# Patient Record
Sex: FEMALE | Race: WHITE | NOT HISPANIC OR LATINO | Employment: FULL TIME | URBAN - NONMETROPOLITAN AREA
[De-identification: names, ages, dates, MRNs, and addresses within clinical notes are randomized per-mention and may not be internally consistent; named-entity substitution may affect disease eponyms.]

---

## 2017-06-03 ENCOUNTER — APPOINTMENT (OUTPATIENT)
Dept: CT IMAGING | Facility: HOSPITAL | Age: 42
End: 2017-06-03

## 2017-06-03 ENCOUNTER — HOSPITAL ENCOUNTER (EMERGENCY)
Facility: HOSPITAL | Age: 42
Discharge: HOME | End: 2017-06-03
Admitting: EMERGENCY MEDICINE

## 2017-06-03 VITALS
OXYGEN SATURATION: 98 % | WEIGHT: 180 LBS | HEIGHT: 67 IN | HEART RATE: 60 BPM | RESPIRATION RATE: 15 BRPM | SYSTOLIC BLOOD PRESSURE: 112 MMHG | TEMPERATURE: 97.8 F | DIASTOLIC BLOOD PRESSURE: 52 MMHG | BODY MASS INDEX: 28.25 KG/M2

## 2017-06-03 DIAGNOSIS — S00.10XS: Primary | ICD-10-CM

## 2017-06-03 DIAGNOSIS — S09.90XA HEAD INJURIES, INITIAL ENCOUNTER: ICD-10-CM

## 2017-06-03 LAB
ALBUMIN SERPL-MCNC: 4 G/DL (ref 3.5–5)
ALBUMIN/GLOB SERPL: 1.2 G/DL (ref 1.1–2.5)
ALP SERPL-CCNC: 53 U/L (ref 24–120)
ALT SERPL W P-5'-P-CCNC: <15 U/L (ref 0–54)
ANION GAP SERPL CALCULATED.3IONS-SCNC: 18 MMOL/L (ref 4–13)
AST SERPL-CCNC: 25 U/L (ref 7–45)
BASOPHILS # BLD AUTO: 0.02 10*3/MM3 (ref 0–0.2)
BASOPHILS NFR BLD AUTO: 0.3 % (ref 0–2)
BILIRUB SERPL-MCNC: 1.1 MG/DL (ref 0.1–1)
BUN BLD-MCNC: 14 MG/DL (ref 5–21)
BUN/CREAT SERPL: 20 (ref 7–25)
CALCIUM SPEC-SCNC: 8.5 MG/DL (ref 8.4–10.4)
CHLORIDE SERPL-SCNC: 100 MMOL/L (ref 98–110)
CO2 SERPL-SCNC: 21 MMOL/L (ref 24–31)
CREAT BLD-MCNC: 0.7 MG/DL (ref 0.5–1.4)
DEPRECATED RDW RBC AUTO: 41.6 FL (ref 40–54)
EOSINOPHIL # BLD AUTO: 0.07 10*3/MM3 (ref 0–0.7)
EOSINOPHIL NFR BLD AUTO: 0.9 % (ref 0–4)
ERYTHROCYTE [DISTWIDTH] IN BLOOD BY AUTOMATED COUNT: 12.8 % (ref 12–15)
ETHANOL UR QL: 0.27 %
GFR SERPL CREATININE-BSD FRML MDRD: 92 ML/MIN/1.73
GLOBULIN UR ELPH-MCNC: 3.4 GM/DL
GLUCOSE BLD-MCNC: 83 MG/DL (ref 70–100)
HCT VFR BLD AUTO: 36.1 % (ref 37–47)
HGB BLD-MCNC: 12.4 G/DL (ref 12–16)
HOLD SPECIMEN: NORMAL
HOLD SPECIMEN: NORMAL
IMM GRANULOCYTES # BLD: 0.01 10*3/MM3 (ref 0–0.03)
IMM GRANULOCYTES NFR BLD: 0.1 % (ref 0–5)
LYMPHOCYTES # BLD AUTO: 1.7 10*3/MM3 (ref 0.72–4.86)
LYMPHOCYTES NFR BLD AUTO: 21.6 % (ref 15–45)
MCH RBC QN AUTO: 30.7 PG (ref 28–32)
MCHC RBC AUTO-ENTMCNC: 34.3 G/DL (ref 33–36)
MCV RBC AUTO: 89.4 FL (ref 82–98)
MONOCYTES # BLD AUTO: 0.37 10*3/MM3 (ref 0.19–1.3)
MONOCYTES NFR BLD AUTO: 4.7 % (ref 4–12)
NEUTROPHILS # BLD AUTO: 5.69 10*3/MM3 (ref 1.87–8.4)
NEUTROPHILS NFR BLD AUTO: 72.4 % (ref 39–78)
PLATELET # BLD AUTO: 171 10*3/MM3 (ref 130–400)
PMV BLD AUTO: 10.4 FL (ref 6–12)
POTASSIUM BLD-SCNC: 3.2 MMOL/L (ref 3.5–5.3)
PROT SERPL-MCNC: 7.4 G/DL (ref 6.3–8.7)
RBC # BLD AUTO: 4.04 10*6/MM3 (ref 4.2–5.4)
SODIUM BLD-SCNC: 139 MMOL/L (ref 135–145)
WBC NRBC COR # BLD: 7.86 10*3/MM3 (ref 4.8–10.8)
WHOLE BLOOD HOLD SPECIMEN: NORMAL
WHOLE BLOOD HOLD SPECIMEN: NORMAL

## 2017-06-03 PROCEDURE — 80307 DRUG TEST PRSMV CHEM ANLYZR: CPT | Performed by: EMERGENCY MEDICINE

## 2017-06-03 PROCEDURE — 70450 CT HEAD/BRAIN W/O DYE: CPT

## 2017-06-03 PROCEDURE — 99285 EMERGENCY DEPT VISIT HI MDM: CPT

## 2017-06-03 PROCEDURE — 70486 CT MAXILLOFACIAL W/O DYE: CPT

## 2017-06-03 PROCEDURE — 85025 COMPLETE CBC W/AUTO DIFF WBC: CPT | Performed by: EMERGENCY MEDICINE

## 2017-06-03 PROCEDURE — 72125 CT NECK SPINE W/O DYE: CPT

## 2017-06-03 PROCEDURE — 80053 COMPREHEN METABOLIC PANEL: CPT | Performed by: EMERGENCY MEDICINE

## 2017-06-03 NOTE — ED PROVIDER NOTES
Subjective   HPI Comments: 41-year-old female presents to our facility with significant other at bedside with a complaint of a fall onto her right side of the face at approximately 3:00 in the morning.  She states that she had alcohol last night and inadvertently fell tripped onto the concrete pavement surface.  There was no loss of consciousness.    There is no nausea or vomiting and she seems to be comfortable at this time awake and conversive. she states that her face does hurt.    Patient is a 41 y.o. female presenting with fall.   History provided by:  Patient and significant other   used: No    Fall   Mechanism of injury: fall    Injury location:  Face  Facial injury location:  Face  Incident location:  Outdoors  Fall:     Fall occurred:  Tripped    Impact surface:  Rollins    Entrapped after fall: no    Suspicion of alcohol use: yes    Suspicion of drug use: no    Tetanus status:  Unknown  Prior to arrival data:     Bystander interventions:  None    Patient ambulatory at scene: yes      Blood loss:  None    Loss of consciousness: no      Amnesic to event: no      Airway interventions:  None    Breathing interventions:  None    IV access status:  None    IO access:  None    Fluids administered:  None    Medications administered:  None    Immobilization:  None      Review of Systems   Constitutional: Positive for activity change.   HENT: Positive for facial swelling.    Eyes: Negative.    Respiratory: Negative.    Cardiovascular: Negative.    Endocrine: Negative.    Genitourinary: Negative.    Musculoskeletal: Negative.    Skin: Negative.    Allergic/Immunologic: Negative.    Neurological: Negative.    Hematological: Negative.    Psychiatric/Behavioral: Negative.    All other systems reviewed and are negative.      History reviewed. No pertinent past medical history.    No Known Allergies    History reviewed. No pertinent surgical history.    History reviewed. No pertinent family  history.    Social History     Social History   • Marital status: Single     Spouse name: N/A   • Number of children: N/A   • Years of education: N/A     Social History Main Topics   • Smoking status: Never Smoker   • Smokeless tobacco: None   • Alcohol use Yes   • Drug use: No   • Sexual activity: Not Asked     Other Topics Concern   • None     Social History Narrative   • None           Objective   Physical Exam   Constitutional: She is oriented to person, place, and time. She appears well-developed and well-nourished.   HENT:   Head: Normocephalic.   Right Ear: External ear normal.   Left Ear: External ear normal.   Sustained fall  Injury.   Eyes: EOM are normal. Pupils are equal, round, and reactive to light.   Neck: Normal range of motion. Neck supple.   Cardiovascular: Normal rate and regular rhythm.    Pulmonary/Chest: Effort normal and breath sounds normal.   Abdominal: Soft.   Musculoskeletal: Normal range of motion.   Neurological: She is alert and oriented to person, place, and time. She has normal reflexes.   Skin: Skin is warm and dry.   Psychiatric: She has a normal mood and affect. Her behavior is normal. Judgment and thought content normal.   Nursing note and vitals reviewed.      Procedures         ED Course  ED Course   Comment By Time   CT of the maxillofacial reveals no acute fracture.CT of the head without contrast reveals no evidence of intracranial pathology. Jerson BONILLA MD 06/03 0707   CT of the C-spine reveals no acute fractures.  Or significant soft tissue swelling suggestive of fracture. Jerson BONILLA MD 06/03 0708           Naheed Fried #[id number]  (41 y.o. F) 01             Lab Results           CBC & Differential (Final result) Result time: 06/03/17 04:35:00     Final result     Narrative:     The following orders were created for panel order CBC & Differential.  Procedure                               Abnormality         Status                     ---------                                -----------         ------                     CBC Auto Differential[[id number]]        Abnormal            Final result                 Please view results for these tests on the individual orders.               Comprehensive Metabolic Panel (Final result)    Abnormal Component (Lab Inquiry)       Collection Time Result Time GLU BUN CREATININE NA K     06/03/17 04:25:00 06/03/17 04:44:00 83 14 0.70 139 3.2 (L)          Collection Time Result Time CL CO2 CALCIUM PROTEIN ALB     06/03/17 04:25:00 06/03/17 04:44:00 100 21.0 (L) 8.5 7.4 4.00          Collection Time Result Time ALT AST ALK PHOS BILI Total EGFRIFNONA     06/03/17 04:25:00 06/03/17 04:44:00 <15 25 53 1.1 (H) 92          Collection Time Result Time GLOB A/G Ratio BCR ANION GAP     06/03/17 04:25:00 06/03/17 04:44:00 3.4 1.2 20.0 18.0 (H)                      Ethanol (Final result)    Abnormal Component (Lab Inquiry)       Collection Time Result Time ETOH %     06/03/17 04:25:00 06/03/17 04:44:00 0.265 (H)                 Final result     Narrative:     Not for legal purposes. Chain of Custody not followed.                CBC Auto Differential (Final result)    Abnormal Component (Lab Inquiry)       Collection Time Result Time WBC ADJ RBC HGB HCT MCV     06/03/17 04:25:00 06/03/17 04:35:00 7.86 4.04 (L) 12.4 36.1 (L) 89.4          Collection Time Result Time MCH MCHC RDW RDWSD MPV     06/03/17 04:25:00 06/03/17 04:35:00 30.7 34.3 12.8 41.6 10.4          Collection Time Result Time PLT NEUTRO PCT LYMPHO PCT MONO PCT EOS PCT     06/03/17 04:25:00 06/03/17 04:35:00 171 72.4 21.6 4.7 0.9          Collection Time Result Time BASOS PCT AUTO IG% NEUTRO ABS LYMPHS ABS MONOS ABS     06/03/17 04:25:00 06/03/17 04:35:00 0.3 0.1 5.69 1.70 0.37          Collection Time Result Time EOS ABS BASOS ABS AUTO IG#     06/03/17 04:25:00 06/03/17 04:35:00 0.07 0.02 0.01                      Light Blue Top (Final result) Component (Lab Inquiry)       Collection Time  Result Time Extra Tube     06/03/17 04:25:00 06/03/17 06:01:00 hold for add-on  Auto resulted                      Green Top (Gel) (Final result) Component (Lab Inquiry)       Collection Time Result Time Extra Tube     06/03/17 04:25:00 06/03/17 06:01:00 Hold for add-ons.  Auto resulted.                      Lavender Top (Final result) Component (Lab Inquiry)       Collection Time Result Time Extra Tube     06/03/17 04:25:00 06/03/17 06:01:00 hold for add-on  Auto resulted                      Red Top (Final result) Component (Lab Inquiry)       Collection Time Result Time Extra Tube     06/03/17 04:25:00 06/03/17 06:01:00 Hold for add-ons.  Auto resulted.                      [place] Draw (Final result) Result time: 06/04/17 13:11:27     Final result     Narrative:     The following orders were created for panel order [place] Draw.  Procedure                               Abnormality         Status                     ---------                               -----------         ------                     Light Blue Top[[id number]]                                   Final result               Green Top (Gel)[[id number]]                                  Final result               Lavender Top[[id number]]                                     Final result               Red Top[[id number]]                                          Final result               Green Top (No Gel)[[id number]]                                                            Please view results for these tests on the individual orders.            Imaging Results           CT Head Without Contrast (Final result) Result time: 06/03/17 07:37:18     Final result by Azar Adames MD (06/03/17 07:37:18)     Impression:     Impression: Negative unenhanced CT of the brain. Mild right periorbital  swelling is present.     A preliminary report was provided by the [facility] radiology service.                 This report was finalized on 06/03/2017 07:37 by Dr. Askew  MD Rosangela.     Narrative:     EXAMINATION: CT HEAD WO CONTRAST- 6/3/2017 7:35 AM CDT     HISTORY: Head trauma.     DOSE: 596 mGycm (Automatic exposure control technique was implemented in  an effort to keep the radiation dose as low as possible without  compromising image quality)     REPORT:   Multiple axial images of the head were obtained without intravenous  contrast. Reconstructed coronal and sagittal images are also reviewed.  There are no comparison studies. No evidence of intracranial hemorrhage,  mass or mass-effect is identified. The ventricles and basal cisterns  appear within normal limits. Bone windows show no skull fracture. There  is mild swelling in the right periorbital region.                  CT Cervical Spine Without Contrast (Final result) Result time: 06/03/17 08:03:07     Final result by Azar Adames MD (06/03/17 08:03:07)     Impression:     Impression: No acute cervical spine abnormality.     A preliminary report was provided by the [facility] radiology service.           This report was finalized on 06/03/2017 08:03 by Dr. Azar Adames MD.     Narrative:     EXAMINATION: CT CERVICAL SPINE WO CONTRAST- 6/3/2017 8:00 AM CDT     HISTORY: Trauma with neck pain.     DOSE: 303 mGycm (Automatic exposure control technique was implemented in  an effort to keep the radiation dose as low as possible without  compromising image quality)     Report: Multiple axial images of the cervical spine were obtained  without contrast, the examination includes reformatted sagittal and  coronal images. There are no comparison studies.     Motion artifact degrades the study. Alignment is normal and the disc  spaces are preserved. No fracture is identified. The prevertebral soft  tissues have normal thickness. The spinal canal appears patent. The  visualized lung apices appear unremarkable.                  CT Facial Bones Without Contrast (Final result) Result time: 06/03/17 07:40:40     Final result by Azar COSTA  MD Rosangela (06/03/17 07:40:40)     Impression:     1. No acute fractures, mild/moderate right facial and periorbital  swelling is present.     A preliminary report was provided by the [facility] radiology service.           This report was finalized on 06/03/2017 07:40 by Dr. Azar Adames MD.     Narrative:     EXAMINATION: CT FACIAL BONES WO CONTRAST- 6/3/2017 7:37 AM CDT     HISTORY: Trauma.     DOSE: 596 mGycm (Automatic exposure control technique was implemented in  an effort to keep the radiation dose as low as possible without  compromising image quality)     COMPARISON: None      TECHNIQUE: Serial helical tomographic images of the facial bones were  obtained without the use of intravenous contrast. Additionally,  multiplanar reformats in the axial and coronal planes were also  obtained.      FINDINGS:   Moderate streak artifact emanates from the oropharynx. The orbits and  their contents are intact. The paranasal sinuses, mastoid air cells and  middle ear cavities are clear. The visualized osseous structures  demonstrate no fractures or dislocations. The surrounding soft tissues  are unremarkable except for mild/moderate right facial and periorbital  swelling.               ECG Results      None                   MDM  Number of Diagnoses or Management Options  Contusion of periocular region, unspecified laterality, sequela: established and improving  Head injuries, initial encounter: established and improving     Amount and/or Complexity of Data Reviewed  Clinical lab tests: ordered and reviewed  Tests in the radiology section of CPT®: ordered and reviewed  Discussion of test results with the performing providers: yes  Decide to obtain previous medical records or to obtain history from someone other than the patient: yes  Obtain history from someone other than the patient: yes  Discuss the patient with other providers: yes  Independent visualization of images, tracings, or specimens: yes    Risk of  Complications, Morbidity, and/or Mortality  Presenting problems: moderate  Diagnostic procedures: moderate  Management options: moderate    Critical Care  Total time providing critical care: > 105 minutes    Patient Progress  Patient progress: stable      Final diagnoses:   Contusion of periocular region, unspecified laterality, sequela   Head injuries, initial encounter            Jerson BONILLA MD  06/24/17 0453